# Patient Record
Sex: FEMALE | Race: WHITE
[De-identification: names, ages, dates, MRNs, and addresses within clinical notes are randomized per-mention and may not be internally consistent; named-entity substitution may affect disease eponyms.]

---

## 2020-03-04 ENCOUNTER — HOSPITAL ENCOUNTER (OUTPATIENT)
Dept: HOSPITAL 76 - DI.N | Age: 65
Discharge: HOME | End: 2020-03-04
Attending: GENERAL ACUTE CARE HOSPITAL
Payer: MEDICARE

## 2020-03-04 DIAGNOSIS — Z12.31: Primary | ICD-10-CM

## 2020-03-04 PROCEDURE — 77063 BREAST TOMOSYNTHESIS BI: CPT

## 2020-03-04 PROCEDURE — 77067 SCR MAMMO BI INCL CAD: CPT

## 2020-03-04 NOTE — MAMMOGRAPHY REPORT
Reason:  ROUTINE MAMMO

Procedure Date:  03/04/2020   

Accession Number:  [id number] / [id number]                    

Procedure:  [facility] - Screening Mammo w/Ken CPT Code:  

 

***Final Report***

 

 

FULL RESULT:

 

 

EXAM: Screening Mammo w/Ken

 

DATE: 3/4/2020 12:05 PM

 

CLINICAL HISTORY:  Screening encounter. History of bilateral breast 

reduction surgery in 2010.

 

TECHNIQUE: (B) - Bilateral  CC and MLO views were obtained.

 

COMPARISON: 9/24/2015.

 

PARENCHYMAL PATTERN: (F) - The breast(s) demonstrate(s) diffuse fatty 

replacement.

 

FINDINGS:

There are no suspicious masses, calcifications, or areas of distortion.

 

IMPRESSION: Negative examination. BI-RADS category 1.

 

RECOMMENDATION: (ANNUAL)  - Recommend routine annual screening 

mammography.

 

BI-RADS CATEGORY: (1) - Negative.

 

STANDARD QUALIFYING STATEMENTS:

1.  This examination was not reviewed with the aid of Computer-Aided 

Detection (CAD).

2.  A negative or benign  imaging report should not preclude biopsy if 

clinically suspicious findings are present.

3.  Dense breasts may obscure an underlying neoplasm.

4. This examination was reviewed with the aid of 3D breast imaging 

(tomosynthesis).